# Patient Record
Sex: FEMALE | Race: WHITE
[De-identification: names, ages, dates, MRNs, and addresses within clinical notes are randomized per-mention and may not be internally consistent; named-entity substitution may affect disease eponyms.]

---

## 2020-09-06 ENCOUNTER — HOSPITAL ENCOUNTER (EMERGENCY)
Dept: HOSPITAL 57 - BURERS | Age: 25
Discharge: HOME | End: 2020-09-06
Payer: SELF-PAY

## 2020-09-06 DIAGNOSIS — R10.9: Primary | ICD-10-CM

## 2020-09-06 LAB
BACTERIA UR QL AUTO: (no result) HPF
PREGU CONTROL BACKGROUND?: (no result)
PREGU CONTROL BAR APPEAR?: YES
RBC UR QL AUTO: (no result) HPF (ref 0–3)
SP GR UR STRIP: 1.01 (ref 1–1.03)
WBC UR QL AUTO: (no result) HPF (ref 0–3)

## 2020-09-06 PROCEDURE — 81015 MICROSCOPIC EXAM OF URINE: CPT

## 2020-09-06 PROCEDURE — 74176 CT ABD & PELVIS W/O CONTRAST: CPT

## 2020-09-06 PROCEDURE — 81025 URINE PREGNANCY TEST: CPT

## 2020-09-06 PROCEDURE — 96372 THER/PROPH/DIAG INJ SC/IM: CPT

## 2020-09-06 PROCEDURE — 81003 URINALYSIS AUTO W/O SCOPE: CPT

## 2020-09-07 NOTE — CT
CT ABDOMEN AND PELVIS WITHOUT CONTRAST:

 

DATE: 9/6/2020.

 

FINDINGS: 

A noncontrast CT was done for evaluation of bilateral flank pain.  The scan as done without oral or I
V Contrast. 

 

The lung bases are clear.  The liver, spleen, pancreas, adrenal glands, kidneys, and abdominal aorta 
showed no acute findings within the limitations of a noncontrast study.  A single gallstone appears t
o be present in the gallbladder without evidence of wall thickening or stranding around the gallbladd
er.  The kidneys showed no sign of stones or hydronephrosis.  No ureteral calculi were seen.

 

The bowel is not distended and there is no sign of obstruction.  The amount of fecal material present
 is moderate.  No free air or free fluid was seen.

 

CT of the pelvis shows no pelvic masses.  The adnexal regions were unremarkable.  There are no inflam
matory changes.

 

IMPRESSION: 

1.  Solitary gallstone.

 

2.  At most, minimal constipation.

 

Report called to Dr. Croft at 2210 on 9/6/2020.

 

CODE CR

 

POS: HOME